# Patient Record
Sex: FEMALE | Race: WHITE | URBAN - METROPOLITAN AREA
[De-identification: names, ages, dates, MRNs, and addresses within clinical notes are randomized per-mention and may not be internally consistent; named-entity substitution may affect disease eponyms.]

---

## 2019-10-23 ENCOUNTER — HISTORICAL (OUTPATIENT)
Dept: RADIOLOGY | Facility: HOSPITAL | Age: 64
End: 2019-10-23

## 2019-10-23 LAB — POC CREATININE: 0.8 MG/DL (ref 0.6–1.3)

## 2024-01-03 ENCOUNTER — TELEPHONE (OUTPATIENT)
Dept: SMOKING CESSATION | Facility: CLINIC | Age: 69
End: 2024-01-03
Payer: MEDICAID

## 2024-01-03 NOTE — TELEPHONE ENCOUNTER
Pt had not shown up for her smoking cessation appointment.  Called pt.  No answer.  Left voice message with contact information.

## 2024-01-17 ENCOUNTER — TELEPHONE (OUTPATIENT)
Dept: SMOKING CESSATION | Facility: CLINIC | Age: 69
End: 2024-01-17
Payer: MEDICAID

## 2024-01-17 NOTE — TELEPHONE ENCOUNTER
Contacted pt regarding rescheduling her missed smoking cessation appointment.  Pt rescheduled to 2/29/24 at 1 pm at the [facility] location.  Pt was given address and directions.

## 2024-02-29 ENCOUNTER — TELEPHONE (OUTPATIENT)
Dept: SMOKING CESSATION | Facility: CLINIC | Age: 69
End: 2024-02-29
Payer: MEDICAID

## 2024-02-29 NOTE — TELEPHONE ENCOUNTER
Returned pt's call.  Pt stated that she will not be able to come into the office today.  Pt would like to reschedule.  Pt rescheduled to 4/9/24 at 3 pm at the [facility] location. Pt was given address and directions.

## 2024-04-09 ENCOUNTER — TELEPHONE (OUTPATIENT)
Dept: SMOKING CESSATION | Facility: CLINIC | Age: 69
End: 2024-04-09
Payer: MEDICAID

## 2024-04-09 NOTE — TELEPHONE ENCOUNTER
Pt had not shown up for her [facility] appointment.  Called pt this morning to remind her of her appointment this afternoon and had to leave a message.  Called pt back again this afternoon when she had not shown up and left voice message with contact information.

## 2024-05-16 ENCOUNTER — CLINICAL SUPPORT (OUTPATIENT)
Dept: SMOKING CESSATION | Facility: CLINIC | Age: 69
End: 2024-05-16
Payer: COMMERCIAL

## 2024-05-16 DIAGNOSIS — F17.200 NICOTINE DEPENDENCE: Primary | ICD-10-CM

## 2024-05-16 PROCEDURE — 99404 PREV MED CNSL INDIV APPRX 60: CPT | Mod: S$PBB,,,

## 2024-05-16 RX ORDER — METOPROLOL SUCCINATE 25 MG/1
25 TABLET, EXTENDED RELEASE ORAL DAILY
COMMUNITY

## 2024-05-16 RX ORDER — IBUPROFEN 200 MG
1 TABLET ORAL DAILY
Qty: 28 PATCH | Refills: 0 | Status: SHIPPED | OUTPATIENT
Start: 2024-05-16

## 2024-05-16 RX ORDER — LOSARTAN POTASSIUM 25 MG/1
25 TABLET ORAL DAILY
COMMUNITY

## 2024-05-16 RX ORDER — AMLODIPINE BESYLATE 5 MG/1
5 TABLET ORAL DAILY
COMMUNITY

## 2024-05-16 NOTE — PROGRESS NOTES
Patient will be participating in tobacco cessation meetings and will begin the prescribed tobacco cessation medication regimen of 21 mg nicotine patch QD. Patient currently smokes 20 cigarettes per day.  Discussed the role of tobacco cessation program, role of nicotine replacement therapy (NRT) and behavioral changes to assist the patient to reach her goal of being tobacco free. Education and instruction on the role of the NRT, usage and proper placement of the patch. Patient verbalized understanding and willingness to use the patch.  Pt started on rate reduction and wait time of 15 min prior to smoking. Pt's exhaled carbon monoxide level was 18 ppm as per Smokerlyzer. (non- smoker = 0-5 ppm.)

## 2024-06-05 ENCOUNTER — CLINICAL SUPPORT (OUTPATIENT)
Dept: SMOKING CESSATION | Facility: CLINIC | Age: 69
End: 2024-06-05
Payer: COMMERCIAL

## 2024-06-05 DIAGNOSIS — F17.200 NICOTINE DEPENDENCE: Primary | ICD-10-CM

## 2024-06-05 PROCEDURE — 99402 PREV MED CNSL INDIV APPRX 30: CPT | Mod: S$PBB,,,

## 2024-06-05 NOTE — PROGRESS NOTES
Individual Follow-Up Form    6/5/2024    Quit Date:     Clinical Status of Patient: Outpatient    Length of Service: 30 minutes    Continuing Medication: no    Other Medications: none     Target Symptoms: Withdrawal and medication side effects. The following were  rated moderate (3) to severe (4) on TCRS:  Moderate (3): none  Severe (4): none    Comments: Spoke with pt via phone regarding smoking cessation progress.  Pt is smoking 20 cigarettes per day.  Pt stated that she has not started using the 21 mg nicotine patches nor has she implemented any of the behavioral changes that was suggested during her initial visit.  Pt stated that she does not want to start using the nicotine patches until she is ready to quit smoking.  Pt stated that she knows that she can use them now while still smoking to help her cut down but she stated that there will be no benefit because she will just continue to smoke while using the patches.  Discussed picking a quit day.  Pt stated that she is not ready.  Discussed how setting a quit day can be motivating and empowering.  Pt stated that she has too much going on right now.  Encouraged pt to think about a quit day and we will discuss at next visit.  Discussed ambivalence.  Discussed smoking triggers and coping skills.  Pt stated that she smokes due to stress and boredom.  Discussed alternative ways to deal with stress rather than smoking a cigarette.  Also encouraged pt to find a new hobby or find things to do for distraction. Encouraged pt to change her daily routine. Discussed moving her cigarettes, making the inside of her home and vehicle tobacco free, waiting 15 minutes before smoking each cigarette.  Pt will smoke 1 cigarette each hour.  Will follow up with pt in a few weeks.     Diagnosis: F17.200    Next Visit: 2 weeks

## 2024-06-25 ENCOUNTER — CLINICAL SUPPORT (OUTPATIENT)
Dept: SMOKING CESSATION | Facility: CLINIC | Age: 69
End: 2024-06-25
Payer: COMMERCIAL

## 2024-06-25 DIAGNOSIS — F17.200 NICOTINE DEPENDENCE: Primary | ICD-10-CM

## 2024-06-25 PROCEDURE — 99404 PREV MED CNSL INDIV APPRX 60: CPT | Mod: S$PBB,,, | Performed by: INTERNAL MEDICINE

## 2024-06-25 NOTE — PROGRESS NOTES
Individual Follow-Up Form    6/25/2024    Quit Date:     Clinical Status of Patient: Outpatient    Length of Service: 60 minutes    Continuing Medication: no    Other Medications: None     Target Symptoms: Withdrawal and medication side effects. The following were  rated moderate (3) to severe (4) on TCRS:  Moderate (3): None  Severe (4): None    Comments: Patient was seen in clinic this morning in regards to smoking cessation progress update. Patient is currently smoking 12 cigarettes per day. Pt is currently not on any NRT medications. Patient has set a quit day for tomorrow 6/26/24. Encourage patient to start wearing patches. Patient stated she will tomorrow. No patches order today due to patient still having patches from last order. Patient stated she has a support system and is planning on focusing on prayer, reading aloud, and painting apps to help with distraction. Pt doing well with rate reduction and wait times prior to smoking. Commend patient on reduction of cpd. Reviewed strategies, cues, and triggers. Introduced the negative impact of tobacco on health, the health advantages of discontinuing the use of tobacco, time line improved health changes after a quit, withdrawal issues to expect from nicotine and habit, and ways to achieve the goal of a quit. Exhaled carbon monoxide level of 19 ppm per Smokerlyzer (0-6 non-smoker). The patient denies any abnormal behavioral or mental changes at this time. Reviewed coping strategies/habitual behavior/relapse prevention with patient.        Diagnosis: F17.200    Next Visit: 4 Weeks

## 2024-07-18 ENCOUNTER — TELEPHONE (OUTPATIENT)
Dept: SMOKING CESSATION | Facility: CLINIC | Age: 69
End: 2024-07-18
Payer: MEDICAID

## 2024-07-23 ENCOUNTER — CLINICAL SUPPORT (OUTPATIENT)
Dept: SMOKING CESSATION | Facility: CLINIC | Age: 69
End: 2024-07-23
Payer: COMMERCIAL

## 2024-07-23 DIAGNOSIS — F17.200 NICOTINE DEPENDENCE: Primary | ICD-10-CM

## 2024-07-23 PROCEDURE — 99404 PREV MED CNSL INDIV APPRX 60: CPT | Mod: S$PBB,,, | Performed by: INTERNAL MEDICINE

## 2024-07-23 NOTE — PROGRESS NOTES
Individual Follow-Up Form    7/23/2024    Quit Date:     Clinical Status of Patient: Outpatient    Length of Service: 60 minutes    Continuing Medication: yes  Patches    Other Medications: None     Target Symptoms: Withdrawal and medication side effects. The following were  rated moderate (3) to severe (4) on TCRS:  Moderate (3): Urges/ Cravings  Severe (4): None    Comments: Patient was seen in clinic this morning in regards to smoking cessation progress update. Patient is not currently smoking any cigarettes. Pt is 16 days tobacco/smoke free. Pt remains on tobacco cessation medication of 21 mg nicotine patch QD. No patches were ordered at this time. No adverse effects noted at this time. Congratulated pt on quit. Pt stated she feels a difference in her breathing and taste buds. Pt stated she in using the toothpicks to help with urges. Pt believes mornings are the hardest but with coping strategies she is overcoming cravings. Reviewed coping strategies/habitual behavior/relapse prevention with patient. Reviewed strategies, controlling environment, cues, triggers, new goals set. Introduced high risk situations with preparation interventions, caffeine similarities with withdrawal issues of habit and nicotine, Alcohol, Understanding urges, cravings, stress and relaxation. Open discussion with intervention discussion. Exhaled carbon monoxide level 3 of ppm per Smokerlyzer (0-6 non-smoker). The patient denies any abnormal behavioral or mental changes at this time.      Diagnosis: F17.200    Next Visit: 2 weeks

## 2024-08-06 ENCOUNTER — CLINICAL SUPPORT (OUTPATIENT)
Dept: SMOKING CESSATION | Facility: CLINIC | Age: 69
End: 2024-08-06
Payer: COMMERCIAL

## 2024-08-06 DIAGNOSIS — F17.200 NICOTINE DEPENDENCE: Primary | ICD-10-CM

## 2024-08-06 PROCEDURE — 99404 PREV MED CNSL INDIV APPRX 60: CPT | Mod: S$PBB,,, | Performed by: INTERNAL MEDICINE

## 2024-08-06 RX ORDER — IBUPROFEN 200 MG
1 TABLET ORAL DAILY
Qty: 28 PATCH | Refills: 0 | Status: SHIPPED | OUTPATIENT
Start: 2024-08-06

## 2024-08-07 ENCOUNTER — TELEPHONE (OUTPATIENT)
Dept: SMOKING CESSATION | Facility: CLINIC | Age: 69
End: 2024-08-07
Payer: MEDICAID

## 2024-08-22 ENCOUNTER — TELEPHONE (OUTPATIENT)
Dept: SMOKING CESSATION | Facility: CLINIC | Age: 69
End: 2024-08-22
Payer: MEDICAID

## 2024-08-28 ENCOUNTER — CLINICAL SUPPORT (OUTPATIENT)
Dept: SMOKING CESSATION | Facility: CLINIC | Age: 69
End: 2024-08-28
Payer: COMMERCIAL

## 2024-08-28 DIAGNOSIS — F17.200 NICOTINE DEPENDENCE: Primary | ICD-10-CM

## 2024-08-28 PROCEDURE — 99404 PREV MED CNSL INDIV APPRX 60: CPT | Mod: S$PBB,,, | Performed by: INTERNAL MEDICINE

## 2024-08-28 NOTE — PROGRESS NOTES
Individual Follow-Up Form    8/28/2024    Quit Date:     Clinical Status of Patient: Outpatient    Length of Service: 60 minutes    Continuing Medication: yes  Patches    Other Medications: None     Target Symptoms: Withdrawal and medication side effects. The following were  rated moderate (3) to severe (4) on TCRS:  Moderate (3): Urge  Severe (4): None    Comments: Patient was seen in clinic this morning in regards to smoking cessation progress update. Patient is not currently smoking any cigarettes. Pt is 52 days smoke/tobacco free. Congratulated pt on quit. Pt remains on tobacco cessation medication of 21 mg nicotine patch QD. No adverse effects noted at this time. Pt stated today has been the hardest day without a cigarette. Pt had some discouraging news today from her doctor.  Pt has gain 10 lbs since her quit. Pt will work on making good dietary choices. Pt continues to stay active by volunteering at [employer].  Encouraged pt to reward herself. Reviewed coping strategies/habitual behavior/relapse prevention with patient. Reviewed strategies, controlling environment, cues, triggers, new goals set. Introduced high risk situations with preparation interventions, caffeine similarities with withdrawal issues of habit and nicotine, Alcohol, Understanding urges, cravings, stress and relaxation. Open discussion with intervention discussion. The patient denies any abnormal behavioral or mental changes at this time.      Diagnosis: F17.200    Next Visit: 2 weeks

## 2024-09-16 ENCOUNTER — CLINICAL SUPPORT (OUTPATIENT)
Dept: SMOKING CESSATION | Facility: CLINIC | Age: 69
End: 2024-09-16
Payer: COMMERCIAL

## 2024-09-16 DIAGNOSIS — F17.200 NICOTINE DEPENDENCE: Primary | ICD-10-CM

## 2024-09-16 RX ORDER — IBUPROFEN 200 MG
1 TABLET ORAL DAILY
Qty: 28 PATCH | Refills: 0 | Status: SHIPPED | OUTPATIENT
Start: 2024-09-16

## 2024-09-16 NOTE — PROGRESS NOTES
Individual Follow-Up Form    9/16/2024    Quit Date:     Clinical Status of Patient: Outpatient    Length of Service: 60 minutes    Continuing Medication: yes  Patches    Other Medications: None     Target Symptoms: Withdrawal and medication side effects. The following were  rated moderate (3) to severe (4) on TCRS:  Moderate (3): None  Severe (4): None    Comments: Patient was seen in clinic this morning in regards to smoking cessation progress update. Patient is not currently smoking any cigarettes. Pt is 71 days smoke/tobacco free. Congratulated on progress thus far.  Pt remains on tobacco cessation medication of 21 mg nicotine patch QD. No adverse effects noted at this time. Pt will begin using 14 mg nicotine patches. Reviewed strategies, habitual behavior, high risks situations, understanding urges and cravings, stress and relaxation with open discussion and additional interventions, Introduced lapses, relapses, understanding them and analyzing the situation of a lapse, conflict issues that may be linked to a lapse. Exhaled carbon monoxide level of 2 ppm per Smokerlyzer (0-6 non-smoker). The patient denies any abnormal behavioral or mental changes at this time.    Diagnosis: F17.200    Next Visit: 2 weeks

## 2024-10-01 ENCOUNTER — CLINICAL SUPPORT (OUTPATIENT)
Dept: SMOKING CESSATION | Facility: CLINIC | Age: 69
End: 2024-10-01
Payer: COMMERCIAL

## 2024-10-01 DIAGNOSIS — F17.200 NICOTINE DEPENDENCE: Primary | ICD-10-CM

## 2024-10-01 PROCEDURE — 99402 PREV MED CNSL INDIV APPRX 30: CPT | Mod: S$PBB,,, | Performed by: INTERNAL MEDICINE

## 2024-10-01 NOTE — PROGRESS NOTES
Individual Follow-Up Form    10/1/2024    Quit Date: 07/07/2024    Clinical Status of Patient: Outpatient    Length of Service: 30 minutes    Continuing Medication: yes  Patches    Other Medications: None     Target Symptoms: Withdrawal and medication side effects. The following were  rated moderate (3) to severe (4) on TCRS:  Moderate (3): Urges  Severe (4): None    Comments: Spoke with patient via phone due to conflict in schedule this morning in regards to smoking cessation progress update. Patient is currently 86 days smoke free. Pt remains on tobacco cessation medication of 14 mg nicotine patch QD. No adverse effects noted at this time. Pt stated she noticed a difference in the dosage. Pt is still doing well with her quit. Reviewed coping strategies/habitual behavior/relapse prevention with patient. Reviewed strategies, urges and cravings, stress, lapses, relapses, eating habits and conflict issues that may be linked to a lapse. Discussed handling a personal issue with a friend and ways to stop the issue from causing a slip. The patient denies any abnormal behavioral or mental changes at this time.    Diagnosis: F17.200    Next Visit: 2 weeks

## 2024-10-14 ENCOUNTER — TELEPHONE (OUTPATIENT)
Dept: SMOKING CESSATION | Facility: CLINIC | Age: 69
End: 2024-10-14
Payer: MEDICAID

## 2024-10-16 ENCOUNTER — CLINICAL SUPPORT (OUTPATIENT)
Dept: SMOKING CESSATION | Facility: CLINIC | Age: 69
End: 2024-10-16
Payer: COMMERCIAL

## 2024-10-16 DIAGNOSIS — F17.200 NICOTINE DEPENDENCE: Primary | ICD-10-CM

## 2024-10-16 RX ORDER — IBUPROFEN 200 MG
1 TABLET ORAL DAILY
Qty: 28 PATCH | Refills: 0 | Status: SHIPPED | OUTPATIENT
Start: 2024-10-16

## 2024-10-16 NOTE — PROGRESS NOTES
Individual Follow-Up Form    10/16/2024    Quit Date: 7/7/24    Clinical Status of Patient: Outpatient    Length of Service: 60 minutes    Continuing Medication: yes  Patches    Other Medications: None     Target Symptoms: Withdrawal and medication side effects. The following were  rated moderate (3) to severe (4) on TCRS:  Moderate (3): None  Severe (4): None    Comments: Patient was seen in clinic this afternoon in regards to smoking cessation progress update. Pt is 101 days smoke/tobacco free. Congratulated on quit.  Pt remains on tobacco cessation medication of 14 mg nicotine patch QD. No adverse effects noted at this time. Pt doing well with quit. Pt stated she is still using toothpicks to help with urges. Pt stated the urges are less and not as strong. Reviewed coping strategies/habitual behavior/relapse prevention with patient. Reviewed strategies, cues, triggers, high risk situations, lapses, relapses, diet, exercise, stress, relaxation, sleep, habitual behavior, and life style changes. Exhaled carbon monoxide level of 2 ppm per Smokerlyzer (0-6 non-smoker). The patient denies any abnormal behavioral or mental changes at this time.    Diagnosis: F17.200    Next Visit: 2 weeks

## 2024-11-06 ENCOUNTER — CLINICAL SUPPORT (OUTPATIENT)
Dept: SMOKING CESSATION | Facility: CLINIC | Age: 69
End: 2024-11-06
Payer: COMMERCIAL

## 2024-11-06 DIAGNOSIS — F17.200 NICOTINE DEPENDENCE: Primary | ICD-10-CM

## 2024-11-06 RX ORDER — IBUPROFEN 200 MG
1 TABLET ORAL DAILY
Qty: 28 PATCH | Refills: 0 | Status: SHIPPED | OUTPATIENT
Start: 2024-11-06

## 2024-11-06 NOTE — PROGRESS NOTES
Individual Follow-Up Form    11/6/2024    Quit Date: 7/7/24    Clinical Status of Patient: Outpatient    Length of Service: 60 minutes    Continuing Medication: yes  Patches    Other Medications: None     Target Symptoms: Withdrawal and medication side effects. The following were  rated moderate (3) to severe (4) on TCRS:  Moderate (3): Cravings/ Urges  Severe (4): None    Comments: Patient was seen in clinic this morning in regards to smoking cessation progress update. Pt is 122 days smoke/tobacco free. Congratulated on quit.  Pt remains on tobacco cessation medication of 14 mg nicotine patch QD. No adverse effects noted at this time. Pt doing well with quit. Pt stated she had a few dreams about smoking. She also stated that she is eating more than usual. Pt believes she is thinking more about cigarettes since she decreased to 14 mg patches. Reviewed coping strategies/habitual behavior/relapse prevention with patient. Reviewed strategies, cues, triggers, high risk situations, lapses, relapses, diet, exercise, stress, relaxation, sleep, habitual behavior, and life style changes. Exhaled carbon monoxide level of 1 ppm per Smokerlyzer (0-6 non-smoker). The patient denies any abnormal behavioral or mental changes at this time.      Diagnosis: F17.200    Next Visit: 2 weeks

## 2024-11-18 ENCOUNTER — TELEPHONE (OUTPATIENT)
Dept: SMOKING CESSATION | Facility: CLINIC | Age: 69
End: 2024-11-18
Payer: MEDICAID

## 2024-11-19 ENCOUNTER — CLINICAL SUPPORT (OUTPATIENT)
Dept: SMOKING CESSATION | Facility: CLINIC | Age: 69
End: 2024-11-19
Payer: COMMERCIAL

## 2024-11-19 DIAGNOSIS — F17.200 NICOTINE DEPENDENCE: Primary | ICD-10-CM

## 2024-11-19 PROCEDURE — 99407 BEHAV CHNG SMOKING > 10 MIN: CPT | Mod: S$GLB,,,

## 2024-11-19 NOTE — PROGRESS NOTES
Spoke with patient today in regard to smoking cessation progress for 6 month phone follow up on Quit 1.  Patient is tobacco free at this time and stated that she continues to work.  Commended patient on their success and progress thus far.  Patient currently enrolled in program for Quit 1 and has an appointment scheduled with his CTTS.  Patient was very appreciative for our program and the counseling she has received from CTTS, Karen Hyde.  Informed patient of benefit period and contact information if any further help or support is needed.  Will complete smart form for 6 month follow up on Quit attempt # 1.

## 2024-11-20 ENCOUNTER — TELEPHONE (OUTPATIENT)
Dept: SMOKING CESSATION | Facility: CLINIC | Age: 69
End: 2024-11-20
Payer: MEDICAID

## 2024-11-20 NOTE — TELEPHONE ENCOUNTER
Called pt regarding request about asking her to share her smoking cessation story. Pt agreed. Forward contact information to patient via email.

## 2024-11-27 ENCOUNTER — CLINICAL SUPPORT (OUTPATIENT)
Dept: SMOKING CESSATION | Facility: CLINIC | Age: 69
End: 2024-11-27
Payer: COMMERCIAL

## 2024-11-27 DIAGNOSIS — F17.200 NICOTINE DEPENDENCE: Primary | ICD-10-CM

## 2024-11-27 NOTE — PROGRESS NOTES
Individual Follow-Up Form    11/27/2024    Quit Date: 7/7/2024    Clinical Status of Patient: Outpatient    Length of Service: 60 minutes    Continuing Medication: yes  Patches    Other Medications: None     Target Symptoms: Withdrawal and medication side effects. The following were  rated moderate (3) to severe (4) on TCRS:  Moderate (3): Urges  Severe (4):     Comments: Patient was seen in clinic this morning in regards to smoking cessation progress update. Pt is 143 days smoke/tobacco free. Congratulated on quit.  Pt remains on tobacco cessation medication of 14 mg nicotine patch QD. No adverse effects noted at this time. Pt doing well with quit. Pt doing well with rate reduction and wait times prior to smoking. Reviewed coping strategies/habitual behavior/relapse prevention with patient. Pt received unexpected news that could have cause a trigger. Pt was determined and over came the trigger. Exhaled carbon monoxide level of 2 ppm per Smokerlyzer (0-6 non-smoker). The patient denies any abnormal behavioral or mental changes at this time.    Diagnosis: F17.200    Next Visit: 2 weeks

## 2024-12-18 ENCOUNTER — CLINICAL SUPPORT (OUTPATIENT)
Dept: SMOKING CESSATION | Facility: CLINIC | Age: 69
End: 2024-12-18
Payer: COMMERCIAL

## 2024-12-18 DIAGNOSIS — F17.200 NICOTINE DEPENDENCE: Primary | ICD-10-CM

## 2024-12-18 RX ORDER — NICOTINE 7MG/24HR
1 PATCH, TRANSDERMAL 24 HOURS TRANSDERMAL DAILY
Qty: 28 PATCH | Refills: 0 | Status: SHIPPED | OUTPATIENT
Start: 2024-12-18

## 2024-12-18 NOTE — PROGRESS NOTES
Individual Follow-Up Form    12/18/2024    Quit Date:     Clinical Status of Patient: Outpatient    Length of Service: 60 minutes    Continuing Medication: yes  Patches    Other Medications: None     Target Symptoms: Withdrawal and medication side effects. The following were  rated moderate (3) to severe (4) on TCRS:  Moderate (3): Urges  Severe (4): None    Comments: Spoke with patient via phone this morning in regards to smoking cessation progress update. Patient is currently 164 days smoke free. Pt remains on tobacco cessation medication of 14 mg nicotine patch QD. No adverse effects noted at this time. Pt will start 7 mg patches in a week. Pt is a little concerned about decreasing nicotine. Informed pt she is capable of getting completely off of nicotine. Pt doing well with rate reduction and wait times prior to smoking. Pt realize stress is a trigger but continues to overcome stressful situations. Reviewed coping strategies/habitual behavior/relapse prevention with patient.  Reviewed strategies, habitual behavior, high risks situations, understanding urges and cravings, stress and relaxation with open discussion and additional interventions, lapses, relapses, understanding them and analyzing the situation of a lapse, conflict issues that may be linked to a lapse.  The patient denies any abnormal behavioral or mental changes at this time.    Diagnosis: F17.200    Next Visit: 2 weeks

## 2025-01-13 ENCOUNTER — TELEPHONE (OUTPATIENT)
Dept: SMOKING CESSATION | Facility: CLINIC | Age: 70
End: 2025-01-13
Payer: MEDICAID

## 2025-01-13 NOTE — TELEPHONE ENCOUNTER
Pt called to cancel appt stating she is not feeling well. Pt will call at a later time to reschedule.

## 2025-02-05 ENCOUNTER — TELEPHONE (OUTPATIENT)
Dept: SMOKING CESSATION | Facility: CLINIC | Age: 70
End: 2025-02-05
Payer: MEDICAID

## 2025-05-23 ENCOUNTER — CLINICAL SUPPORT (OUTPATIENT)
Dept: SMOKING CESSATION | Facility: CLINIC | Age: 70
End: 2025-05-23
Payer: COMMERCIAL

## 2025-05-23 DIAGNOSIS — F17.200 NICOTINE DEPENDENCE, UNCOMPLICATED: Primary | ICD-10-CM

## 2025-05-23 PROCEDURE — 99999 PR PBB SHADOW E&M-EST. PATIENT-LVL I: CPT | Mod: PBBFAC,,, | Performed by: GENERAL PRACTICE

## 2025-05-23 NOTE — PROGRESS NOTES
Spoke with patient today in regard to smoking cessation progress for 12 month follow up. She states she is tobacco free. Congratulated patient on their success to remain tobacco free. Patient states that her mother had a stroke on Friday, and she said that smoking a cigarette was the furthest thing from her mind. Informed patient of benefit period, future follow ups and contact information if any further help is needed. Will resolve smart form for 12 month follow up for Quit # 1.